# Patient Record
Sex: FEMALE | Race: ASIAN | NOT HISPANIC OR LATINO | Employment: UNEMPLOYED | URBAN - METROPOLITAN AREA
[De-identification: names, ages, dates, MRNs, and addresses within clinical notes are randomized per-mention and may not be internally consistent; named-entity substitution may affect disease eponyms.]

---

## 2020-01-29 LAB
EXTERNAL HIV SCREEN: NORMAL
HCV AB SER-ACNC: NEGATIVE

## 2020-03-19 ENCOUNTER — TRANSCRIBE ORDERS (OUTPATIENT)
Dept: PERINATAL CARE | Facility: CLINIC | Age: 25
End: 2020-03-19

## 2020-03-19 DIAGNOSIS — O09.899 SUPERVISION OF OTHER HIGH RISK PREGNANCIES, UNSPECIFIED TRIMESTER: Primary | ICD-10-CM

## 2020-04-02 ENCOUNTER — TELEPHONE (OUTPATIENT)
Dept: PERINATAL CARE | Facility: CLINIC | Age: 25
End: 2020-04-02

## 2020-04-03 ENCOUNTER — ROUTINE PRENATAL (OUTPATIENT)
Dept: PERINATAL CARE | Facility: CLINIC | Age: 25
End: 2020-04-03
Payer: COMMERCIAL

## 2020-04-03 VITALS
WEIGHT: 122.8 LBS | HEART RATE: 100 BPM | DIASTOLIC BLOOD PRESSURE: 62 MMHG | SYSTOLIC BLOOD PRESSURE: 98 MMHG | BODY MASS INDEX: 21.76 KG/M2 | HEIGHT: 63 IN

## 2020-04-03 DIAGNOSIS — Z36.3 ENCOUNTER FOR ANTENATAL SCREENING FOR MALFORMATION USING ULTRASOUND: Primary | ICD-10-CM

## 2020-04-03 DIAGNOSIS — O09.899 SUPERVISION OF OTHER HIGH RISK PREGNANCIES, UNSPECIFIED TRIMESTER: ICD-10-CM

## 2020-04-03 DIAGNOSIS — Z3A.23 23 WEEKS GESTATION OF PREGNANCY: ICD-10-CM

## 2020-04-03 DIAGNOSIS — Z03.79 SUSPECTED FETAL CONDITION NOT FOUND: ICD-10-CM

## 2020-04-03 PROCEDURE — 76811 OB US DETAILED SNGL FETUS: CPT | Performed by: OBSTETRICS & GYNECOLOGY

## 2020-04-03 PROCEDURE — 99241 PR OFFICE CONSULTATION NEW/ESTAB PATIENT 15 MIN: CPT | Performed by: OBSTETRICS & GYNECOLOGY

## 2020-04-03 RX ORDER — FERROUS SULFATE 325(65) MG
325 TABLET ORAL
COMMUNITY

## 2020-05-13 ENCOUNTER — TRANSCRIBE ORDERS (OUTPATIENT)
Dept: PERINATAL CARE | Facility: CLINIC | Age: 25
End: 2020-05-13

## 2020-05-13 DIAGNOSIS — O24.419 GESTATIONAL DIABETES MELLITUS (GDM): Primary | ICD-10-CM

## 2020-05-14 DIAGNOSIS — O99.810 ABNORMAL GLUCOSE TOLERANCE TEST (GTT) DURING PREGNANCY, ANTEPARTUM: Primary | ICD-10-CM

## 2020-05-14 DIAGNOSIS — Z3A.28 28 WEEKS GESTATION OF PREGNANCY: ICD-10-CM

## 2020-05-14 DIAGNOSIS — O24.419 GESTATIONAL DIABETES MELLITUS (GDM) IN THIRD TRIMESTER, GESTATIONAL DIABETES METHOD OF CONTROL UNSPECIFIED: ICD-10-CM

## 2020-05-14 RX ORDER — LANCETS 33 GAUGE
EACH MISCELLANEOUS
Qty: 100 EACH | Refills: 4 | Status: SHIPPED | OUTPATIENT
Start: 2020-05-14

## 2020-05-14 RX ORDER — BLOOD-GLUCOSE METER
EACH MISCELLANEOUS
Qty: 1 KIT | Refills: 0 | Status: SHIPPED | OUTPATIENT
Start: 2020-05-14

## 2020-05-14 RX ORDER — BLOOD SUGAR DIAGNOSTIC
STRIP MISCELLANEOUS
Qty: 100 EACH | Refills: 4 | Status: SHIPPED | OUTPATIENT
Start: 2020-05-14

## 2020-05-14 RX ORDER — LANCETS
EACH MISCELLANEOUS
Qty: 102 EACH | Refills: 4 | Status: SHIPPED | OUTPATIENT
Start: 2020-05-14

## 2020-05-20 ENCOUNTER — TELEPHONE (OUTPATIENT)
Dept: PERINATAL CARE | Facility: CLINIC | Age: 25
End: 2020-05-20

## 2020-05-22 ENCOUNTER — TELEMEDICINE (OUTPATIENT)
Dept: PERINATAL CARE | Facility: CLINIC | Age: 25
End: 2020-05-22

## 2020-05-22 DIAGNOSIS — Z3A.30 30 WEEKS GESTATION OF PREGNANCY: ICD-10-CM

## 2020-05-22 DIAGNOSIS — O24.419 GESTATIONAL DIABETES MELLITUS (GDM) IN THIRD TRIMESTER, GESTATIONAL DIABETES METHOD OF CONTROL UNSPECIFIED: Primary | ICD-10-CM

## 2020-05-22 PROCEDURE — NC001 PR NO CHARGE: Performed by: DIETITIAN, REGISTERED

## 2020-05-28 ENCOUNTER — TELEPHONE (OUTPATIENT)
Dept: PERINATAL CARE | Facility: OTHER | Age: 25
End: 2020-05-28

## 2020-05-29 ENCOUNTER — TELEPHONE (OUTPATIENT)
Dept: PERINATAL CARE | Facility: CLINIC | Age: 25
End: 2020-05-29

## 2020-05-29 ENCOUNTER — ULTRASOUND (OUTPATIENT)
Dept: PERINATAL CARE | Facility: CLINIC | Age: 25
End: 2020-05-29
Payer: COMMERCIAL

## 2020-05-29 VITALS
WEIGHT: 132.6 LBS | HEART RATE: 106 BPM | SYSTOLIC BLOOD PRESSURE: 108 MMHG | BODY MASS INDEX: 23.5 KG/M2 | HEIGHT: 63 IN | TEMPERATURE: 98.4 F | DIASTOLIC BLOOD PRESSURE: 68 MMHG

## 2020-05-29 DIAGNOSIS — Z3A.31 31 WEEKS GESTATION OF PREGNANCY: ICD-10-CM

## 2020-05-29 DIAGNOSIS — O24.410 DIET CONTROLLED GESTATIONAL DIABETES MELLITUS (GDM) IN THIRD TRIMESTER: Primary | ICD-10-CM

## 2020-05-29 PROBLEM — Z03.79 SUSPECTED FETAL CONDITION NOT FOUND: Status: RESOLVED | Noted: 2020-04-03 | Resolved: 2020-05-29

## 2020-05-29 PROCEDURE — 76816 OB US FOLLOW-UP PER FETUS: CPT | Performed by: OBSTETRICS & GYNECOLOGY

## 2020-05-29 PROCEDURE — 99212 OFFICE O/P EST SF 10 MIN: CPT | Performed by: OBSTETRICS & GYNECOLOGY

## 2020-06-03 ENCOUNTER — TELEMEDICINE (OUTPATIENT)
Dept: PERINATAL CARE | Facility: CLINIC | Age: 25
End: 2020-06-03

## 2020-06-03 DIAGNOSIS — Z3A.32 32 WEEKS GESTATION OF PREGNANCY: ICD-10-CM

## 2020-06-03 DIAGNOSIS — O24.410 DIET CONTROLLED GESTATIONAL DIABETES MELLITUS (GDM) IN THIRD TRIMESTER: Primary | ICD-10-CM

## 2020-06-03 PROCEDURE — NC001 PR NO CHARGE: Performed by: DIETITIAN, REGISTERED

## 2020-06-10 ENCOUNTER — DOCUMENTATION (OUTPATIENT)
Dept: PERINATAL CARE | Facility: CLINIC | Age: 25
End: 2020-06-10

## 2020-06-30 ENCOUNTER — TELEPHONE (OUTPATIENT)
Dept: PERINATAL CARE | Facility: CLINIC | Age: 25
End: 2020-06-30

## 2020-07-01 ENCOUNTER — ULTRASOUND (OUTPATIENT)
Dept: PERINATAL CARE | Facility: CLINIC | Age: 25
End: 2020-07-01
Payer: COMMERCIAL

## 2020-07-01 ENCOUNTER — DOCUMENTATION (OUTPATIENT)
Dept: PERINATAL CARE | Facility: CLINIC | Age: 25
End: 2020-07-01

## 2020-07-01 VITALS
BODY MASS INDEX: 24.8 KG/M2 | DIASTOLIC BLOOD PRESSURE: 68 MMHG | HEART RATE: 101 BPM | WEIGHT: 140 LBS | HEIGHT: 63 IN | SYSTOLIC BLOOD PRESSURE: 102 MMHG | TEMPERATURE: 99 F

## 2020-07-01 DIAGNOSIS — Z3A.35 35 WEEKS GESTATION OF PREGNANCY: Primary | ICD-10-CM

## 2020-07-01 DIAGNOSIS — Z36.89 ENCOUNTER FOR ULTRASOUND TO ASSESS FETAL GROWTH: ICD-10-CM

## 2020-07-01 DIAGNOSIS — O24.410 DIET CONTROLLED GESTATIONAL DIABETES MELLITUS (GDM) IN THIRD TRIMESTER: ICD-10-CM

## 2020-07-01 PROCEDURE — 76816 OB US FOLLOW-UP PER FETUS: CPT | Performed by: OBSTETRICS & GYNECOLOGY

## 2020-07-01 PROCEDURE — 99212 OFFICE O/P EST SF 10 MIN: CPT | Performed by: OBSTETRICS & GYNECOLOGY

## 2020-07-01 NOTE — PROGRESS NOTES
Jay Quan: Ms Paige Tavera was seen today at 35w5d for fetal growth assessment ultrasound  See ultrasound report under "OB Procedures" tab    Please don't hesitate to contact our office with any concerns or questions   -Elvin Jin MD

## 2020-07-01 NOTE — PROGRESS NOTES
Date:  20  RE: Bimal Johan    : 1995  Estimated Date of Delivery: 20  EGA: 35w6d  OB/GYN: Jay Miles                    In office  at ultrasound appointment  Dr Darcy Rosado reviewed blood sugar log with patient  Last blood glucose log reported was on 6/10/20  Current regimen:  2000 calorie gestational diabetes meal plan; 3 meals and 3 snacks  Change bedtime snack to 1 serving of CHO (15 gms) and increase protein to 2-3 ounces  Patient was also advised to avoid juice and limit rice to 1 cup serving  Instructed to pair carbohydrate with protein in all meals and snacks  Maintain consistent meal and snack times  SMBG 4 times per day; fasting and 2 hrs pp with a OneTouch Verio blood glucose meter  Plan: Attempted to call patient to discuss blood sugars and also review reporting procedure  Noted patient is not consistently testing QID  Patient's mailbox was full and was not accepting messages  Per Dr Darcy Rosado: Patient's fasting and post-breakfast values are at goal  1/3 to 1/4 of post-lunch and post-dinner values were above goal in early , but as she has made  dietary changes, the majority of post-meal values are now at goal  Signing up for [employer] was suggested to make it easier to communicate with the Diabetes program, and stressed the importance of weekly follow-up and reporting her glucose logs  Exercise after lunch/dinner was suggested to help with glycemic control  Previous diet suggestions discussed on last report:continue diet; suggested decreasing 1 carbohydrate serving and increase 1 Protein serving at lunch meal  Patient is willing to try 1 cup brown rice and adding vegetable and salad with protein to avoid overeating rice with 2 hr pp>120 mg/dl  Continue SMBG 4 times per day    Walk 20-30 minutes after lunch if there are no exercise restrictions from her OB   20 US impressions: fetal growth and SARAH appeared normal   Next scheduled US is 7/29/20  Date due to report next:  Message sent to [facility]  [facility] Diabetes and Pregnancy  to schedule a telemedicine appointment to review above and improve future weekly reporting       Jorge Luis Peers  Diabetes Educator  Diabetes and Pregnancy Program

## 2020-07-10 ENCOUNTER — DOCUMENTATION (OUTPATIENT)
Dept: PERINATAL CARE | Facility: CLINIC | Age: 25
End: 2020-07-10

## 2020-07-10 ENCOUNTER — TELEMEDICINE (OUTPATIENT)
Dept: PERINATAL CARE | Facility: CLINIC | Age: 25
End: 2020-07-10
Payer: COMMERCIAL

## 2020-07-10 DIAGNOSIS — O24.419 GESTATIONAL DIABETES MELLITUS (GDM) IN THIRD TRIMESTER, GESTATIONAL DIABETES METHOD OF CONTROL UNSPECIFIED: Primary | ICD-10-CM

## 2020-07-10 DIAGNOSIS — Z3A.37 37 WEEKS GESTATION OF PREGNANCY: ICD-10-CM

## 2020-07-10 PROCEDURE — G0108 DIAB MANAGE TRN  PER INDIV: HCPCS

## 2020-07-10 NOTE — PROGRESS NOTES
Date:  07/10/20  RE: Radha Corrales    : 1995  Estimated Date of Delivery: 20  EGA: 37w0d  [facility]N: [facility]    20: FB-AB: NR-AL: NR-  20 FB- AB: NR- AL: NR- AD: 136  7/3/20: FB- AB: NR-AL: NR-AD: 125  20: FB-AB: NR- AL: NR- AD: 94  20: FB- AB: NR- AL: NR- AD: 87  20: FB- AB: NR- AL: NR- AD: 129  20: FB- AB: NR: AL: NR- AD: 132  20 FB- AB: NR- AL: NR- AD: 95  20: FB- AB: NR- AL: NR- AD: 134 (too much rice; fried fish no walk)  7/10/20 FB      In office  at ultrasound appointment  Dr Piero Sanders reviewed blood sugar log with patient  Last blood glucose log reported was on 6/10/20  Blood sugars reported at today's follow up DSMT appointment  Current regimen:  2000 calorie gestational diabetes meal plan; 3 meals and 3 snacks  Change bedtime snack to 1 serving of CHO (15 gms) and increase protein to 2-3 ounces  Patient was also advised to avoid juice and limit rice to 1 cup serving  Instructed to pair carbohydrate with protein in all meals and snacks  Maintain consistent meal and snack times  SMBG 4 times per day; fasting and 2 hrs pp with a OneTouch Verio blood glucose meter  FBG follow often >8-10 hr fast   Diet recall assessed in follow up noted on   Plan:   Previous diet suggestions discussed on last report:continue diet; suggested decreasing 1 carbohydrate serving and increase 1 Protein serving at lunch meal  Patient is willing to try 1 cup brown rice and adding vegetable and salad with protein to avoid overeating rice with 2 hr pp>120 mg/dl  Timing of meals and snacks reviewed as well as sample menus for meals and snacks was suggested  Continue SMBG 4 times per day  FBG measurement should follow no longer than an 8-10 hr fast  Set phone alarm for reminders in order to miss less 2 hr pp measurements  Advised patient to check blood sugar if she is not feeling well     Hypoglycemia s/s and treatment was again reviewed  Encouraged walking 20-30 minutes after lunch if there are no exercise restrictions from her OB   7/1/20 US impressions: fetal growth and SARAH appeared normal   Next scheduled US is 7/29/20  Date due to report next:  Monday, 7/13 via voicemail message  Provided with phone number        Albina Osgood  Diabetes Educator  Diabetes and Pregnancy Program

## 2020-07-10 NOTE — PROGRESS NOTES
Virtual Regular Visit      Assessment/Plan:    Problem List Items Addressed This Visit     None               Reason for visit is   Chief Complaint   Patient presents with    Gestational Diabetes    Patient Education    Virtual Regular Visit        Encounter provider Rosana Ritchie    Provider located at [address] [place] [id number] [phone]      Recent Visits  No visits were found meeting these conditions  Showing recent visits within past 7 days and meeting all other requirements     Today's Visits  Date Type Provider Dept   07/10/20 1401 [address]   Showing today's visits and meeting all other requirements     Future Appointments  No visits were found meeting these conditions  Showing future appointments within next 150 days and meeting all other requirements        The patient was identified by name and date of birth  Britany Escudero was informed that this is a telemedicine visit and that the visit is being conducted through [employer]  My office door was closed  No one else was in the room  She acknowledged consent and understanding of privacy and security of the video platform  The patient has agreed to participate and understands they can discontinue the visit at any time  Patient is aware this is a billable service  Subjective  Britany Escudero is a 26 y o  pregnant female  HPI     Past Medical History:   Diagnosis Date    No known health problems        No past surgical history on file  Current Outpatient Medications   Medication Sig Dispense Refill    Accu-Chek FastClix Lancets MISC Use 4 a day and as directed  (Patient not taking: Reported on 7/1/2020) 102 each 4    ACCU-CHEK GUIDE test strip Test 4 times a day and as instructed  Gestational diabetes   (Patient not taking: Reported on 7/1/2020) 100 each 4    Blood Glucose Monitoring Suppl ([facility]) w/Device KIT Dispense 1 kit per insurance formulary  1 kit 0    Blood Glucose Monitoring Suppl (ONETOUCH VERIO FLEX SYSTEM) w/Device KIT Dispense 1 kit per insurance formulary  1 kit 0    ferrous sulfate 325 (65 Fe) mg tablet Take 325 mg by mouth daily with breakfast      OneTouch Delica Lancets 22O MISC Use 4 a day and as directed  100 each 4    ONETOUCH VERIO test strip Test 4 times a day and as instructed  Gestational diabetes  100 each 4    Prenatal Vit w/Cb-Xabkgmltz-BD (PNV PO) Take 1 tablet by mouth daily       No current facility-administered medications for this visit  No Known Allergies    Review of Systems    Video Exam    There were no vitals filed for this visit  Physical Exam     Time spent with patient 9:00-9:30 AM       VIRTUAL VISIT DISCLAIMER    Nori Janes acknowledges that she has consented to an online visit or consultation  She understands that the online visit is based solely on information provided by her, and that, in the absence of a face-to-face physical evaluation by the physician, the diagnosis she receives is both limited and provisional in terms of accuracy and completeness  This is not intended to replace a full medical face-to-face evaluation by the physician  Nori Marrero understands and accepts these terms  DATE: 07/10/20   RE: Nori Marrero   : 1995  PITA: Estimated Date of Delivery: 20  EGA:  37w0d  Referring Provider: Jackelin Aldrich    Thank you for referring your patient to the Diabetes and Pregnancy Program at [address] [place] [address]  The patient was seen today for medical nutrition therapy for the treatment of diabetes during pregnancy  In addition to diabetes, the nutrition status is complicated by some elevated fasting blood glucose and 2 hr pp measurements  The following was reviewed with the patient:      Weight gain during in pregnancy   Based on the patients height of 63 inches, pre-pregnancy weight of 114 pounds (BMI 20) we would recommend a total weight gain of 25-35 pounds for the pregnancy  o The patients current weight is 140 pounds, and her weight gain to date is 26 pounds  Based on this, we are recommending the patient gain no more than 4 pounds for the remainder of the pregnancy   Basic review of macronutrients   Meal pattern should consist of three small meals and three snacks daily   Carbohydrate gram amounts per meal    Instructions on how to read a food label   Appropriate serving size of foods   Incorporating protein at each meal and snack in the importance of protein in relationship to blood glucose control   Individualized meal plan: 2000 gestational diabetes diet   Diet recall revealed patient is sometimes overeating carbohydrate portions especially at lunch and dinner when she is eating rice  Sometimes either snacks or meals are missing protein or adequate protein  Patient also reported that fish and some meats are sometimes fried  She often misses her in-between meal snacks and that will cause overeating especially rice at main meals  She is also often checking FBG following longer than an 8-10 hr fast  She reported she noticed that her blood sugars after meals are higher on the days she is not walking  Patient often missed breakfast and lunch 2 hr pp measurements because she stated "when I do not feel well then I just doesn't check"  When further questioned patient states she sometimes feels dizzy or light headed but not everyday, sometimes she just doesn't test      Timing of meals and snacks as well as testing was reviewed  Reinforced appropriate carbohydrate portions and dietary protein sources  Sample menus for meals and snacks was discussed  Encouraged baking, grilling meats instead of frying  Advised patient to set a phone alarm for reminders to test to miss less blood sugar measurements  Encourage patient to consistently test blood sugars 4 times per day; fasting and 2 hrs pp  Advised to check if she is not feeling well  Reviewed hypoglycemia s/s and treatment   Report blood glucose levels to [address] weekly or as directed  o Phone: [phone]  If no response in 24 hours, call [phone]  Patient prefers to use voicemail to report since she has not activated MyChart    o Follow up: Patient to report blood sugars via voicemail on Monday, 7/13  Provided patient with [facility]  number if she had any questions, concerns or problems reporting blood sugars  Diabetes Self Management Support Plan outside of ongoing care: Other significant other    Thank you for the opportunity to participate in the care of this patient  I can be reached at [phone] should you have any questions  Time spent with patient 9:00-9:30 AM; time spent face to face counseling greater than 50% of the appointment          Armani Orosco RD,LDN,CDE  Diabetes Educator  Diabetes and Pregnancy Program

## 2020-07-28 ENCOUNTER — TELEPHONE (OUTPATIENT)
Dept: PERINATAL CARE | Facility: CLINIC | Age: 25
End: 2020-07-28

## 2023-05-03 NOTE — TELEPHONE ENCOUNTER
---- NO COVID SCREEN 7/28  --  Unable to reach pt by phone, voicemail full    ----    Attempted to reach patient by phone and left voicemail to confirm appointment for MFM ultrasound  1 support person ( must be over the age of 15) may accompany you for your appointment  If you or your support person have traveled outside the state in the past 2 weeks, please call and notify our office today #[phone]  You and your support person must wear a mask ,covering nose and mouth,during your entire visit  You and your support person will have temperature screened upon arrival     To minimize your exposure in our waiting room, please call our office prior to entering the building  Check in and rooming questions will be done via phone  We will give you directions when to enter for your appointment  Inside office # provided:  [place] line: [phone]  [place] line:  [phone]  [facility] line:  [phone] line:  [phone]  [facility] line:  [phone]  [place] line:  [phone]    IF you are not feeling well- cough, fever, shortness of breath or any flu like symptoms, contact your primary care physician or -[facility]    Any questions with these instructions please call Maternal Fetal Medicine nurse line today @ # [phone] [[phone]]